# Patient Record
Sex: FEMALE | Race: BLACK OR AFRICAN AMERICAN | NOT HISPANIC OR LATINO
[De-identification: names, ages, dates, MRNs, and addresses within clinical notes are randomized per-mention and may not be internally consistent; named-entity substitution may affect disease eponyms.]

---

## 2017-12-13 PROBLEM — Z00.00 ENCOUNTER FOR PREVENTIVE HEALTH EXAMINATION: Status: ACTIVE | Noted: 2017-12-13

## 2017-12-14 ENCOUNTER — APPOINTMENT (OUTPATIENT)
Dept: NEUROSURGERY | Facility: CLINIC | Age: 52
End: 2017-12-14
Payer: MEDICAID

## 2017-12-14 VITALS
HEIGHT: 63 IN | TEMPERATURE: 98.2 F | OXYGEN SATURATION: 94 % | HEART RATE: 64 BPM | BODY MASS INDEX: 36.32 KG/M2 | DIASTOLIC BLOOD PRESSURE: 70 MMHG | SYSTOLIC BLOOD PRESSURE: 122 MMHG | WEIGHT: 205 LBS

## 2017-12-14 DIAGNOSIS — Z78.9 OTHER SPECIFIED HEALTH STATUS: ICD-10-CM

## 2017-12-14 PROCEDURE — 99203 OFFICE O/P NEW LOW 30 MIN: CPT

## 2017-12-14 RX ORDER — MELOXICAM 15 MG/1
15 TABLET ORAL
Refills: 0 | Status: ACTIVE | COMMUNITY

## 2018-12-05 ENCOUNTER — APPOINTMENT (OUTPATIENT)
Dept: NEUROSURGERY | Facility: CLINIC | Age: 53
End: 2018-12-05
Payer: COMMERCIAL

## 2018-12-05 VITALS
HEIGHT: 63 IN | SYSTOLIC BLOOD PRESSURE: 133 MMHG | WEIGHT: 205 LBS | HEART RATE: 98 BPM | BODY MASS INDEX: 36.32 KG/M2 | OXYGEN SATURATION: 96 % | TEMPERATURE: 98.8 F | DIASTOLIC BLOOD PRESSURE: 69 MMHG

## 2018-12-05 PROCEDURE — 99214 OFFICE O/P EST MOD 30 MIN: CPT

## 2019-01-02 ENCOUNTER — APPOINTMENT (OUTPATIENT)
Dept: ORTHOPEDIC SURGERY | Facility: CLINIC | Age: 54
End: 2019-01-02
Payer: COMMERCIAL

## 2019-01-02 VITALS
HEART RATE: 80 BPM | BODY MASS INDEX: 36.32 KG/M2 | HEIGHT: 63 IN | WEIGHT: 205 LBS | DIASTOLIC BLOOD PRESSURE: 77 MMHG | SYSTOLIC BLOOD PRESSURE: 152 MMHG

## 2019-01-02 PROCEDURE — 73562 X-RAY EXAM OF KNEE 3: CPT | Mod: LT

## 2019-01-02 PROCEDURE — 99203 OFFICE O/P NEW LOW 30 MIN: CPT

## 2019-01-02 RX ORDER — ATORVASTATIN CALCIUM 80 MG/1
TABLET, FILM COATED ORAL
Refills: 0 | Status: ACTIVE | COMMUNITY

## 2019-01-02 RX ORDER — ASPIRIN 81 MG
81 TABLET, DELAYED RELEASE (ENTERIC COATED) ORAL
Refills: 0 | Status: ACTIVE | COMMUNITY

## 2019-02-05 ENCOUNTER — TRANSCRIPTION ENCOUNTER (OUTPATIENT)
Age: 54
End: 2019-02-05

## 2019-02-06 ENCOUNTER — OUTPATIENT (OUTPATIENT)
Dept: OUTPATIENT SERVICES | Facility: HOSPITAL | Age: 54
LOS: 1 days | End: 2019-02-06
Payer: COMMERCIAL

## 2019-02-06 DIAGNOSIS — M51.9 UNSPECIFIED THORACIC, THORACOLUMBAR AND LUMBOSACRAL INTERVERTEBRAL DISC DISORDER: ICD-10-CM

## 2019-02-12 ENCOUNTER — TRANSCRIPTION ENCOUNTER (OUTPATIENT)
Age: 54
End: 2019-02-12

## 2019-02-13 ENCOUNTER — OUTPATIENT (OUTPATIENT)
Dept: OUTPATIENT SERVICES | Facility: HOSPITAL | Age: 54
LOS: 1 days | End: 2019-02-13
Payer: COMMERCIAL

## 2019-02-13 DIAGNOSIS — M47.894 OTHER SPONDYLOSIS, THORACIC REGION: ICD-10-CM

## 2019-07-01 ENCOUNTER — EMERGENCY (EMERGENCY)
Facility: HOSPITAL | Age: 54
LOS: 1 days | Discharge: HOME | End: 2019-07-01
Attending: EMERGENCY MEDICINE
Payer: COMMERCIAL

## 2019-07-01 VITALS
OXYGEN SATURATION: 99 % | RESPIRATION RATE: 16 BRPM | HEIGHT: 63 IN | WEIGHT: 207.01 LBS | HEART RATE: 73 BPM | TEMPERATURE: 99 F | DIASTOLIC BLOOD PRESSURE: 65 MMHG | SYSTOLIC BLOOD PRESSURE: 115 MMHG

## 2019-07-01 LAB
ALBUMIN SERPL ELPH-MCNC: 3.9 G/DL — SIGNIFICANT CHANGE UP (ref 3.3–5.2)
ALP SERPL-CCNC: 84 U/L — SIGNIFICANT CHANGE UP (ref 40–120)
ALT FLD-CCNC: 35 U/L — HIGH
ANION GAP SERPL CALC-SCNC: 11 MMOL/L — SIGNIFICANT CHANGE UP (ref 5–17)
AST SERPL-CCNC: 37 U/L — HIGH
BASOPHILS # BLD AUTO: 0.02 K/UL — SIGNIFICANT CHANGE UP (ref 0–0.2)
BASOPHILS NFR BLD AUTO: 0.5 % — SIGNIFICANT CHANGE UP (ref 0–2)
BILIRUB SERPL-MCNC: 0.3 MG/DL — LOW (ref 0.4–2)
BUN SERPL-MCNC: 18 MG/DL — SIGNIFICANT CHANGE UP (ref 8–20)
CALCIUM SERPL-MCNC: 9.4 MG/DL — SIGNIFICANT CHANGE UP (ref 8.6–10.2)
CHLORIDE SERPL-SCNC: 106 MMOL/L — SIGNIFICANT CHANGE UP (ref 98–107)
CO2 SERPL-SCNC: 22 MMOL/L — SIGNIFICANT CHANGE UP (ref 22–29)
CREAT SERPL-MCNC: 0.64 MG/DL — SIGNIFICANT CHANGE UP (ref 0.5–1.3)
EOSINOPHIL # BLD AUTO: 0.06 K/UL — SIGNIFICANT CHANGE UP (ref 0–0.5)
EOSINOPHIL NFR BLD AUTO: 1.6 % — SIGNIFICANT CHANGE UP (ref 0–6)
GLUCOSE SERPL-MCNC: 97 MG/DL — SIGNIFICANT CHANGE UP (ref 70–115)
HCT VFR BLD CALC: 37.2 % — SIGNIFICANT CHANGE UP (ref 34.5–45)
HGB BLD-MCNC: 11.8 G/DL — SIGNIFICANT CHANGE UP (ref 11.5–15.5)
IMM GRANULOCYTES NFR BLD AUTO: 0.3 % — SIGNIFICANT CHANGE UP (ref 0–1.5)
LYMPHOCYTES # BLD AUTO: 1.87 K/UL — SIGNIFICANT CHANGE UP (ref 1–3.3)
LYMPHOCYTES # BLD AUTO: 50 % — HIGH (ref 13–44)
MCHC RBC-ENTMCNC: 27.7 PG — SIGNIFICANT CHANGE UP (ref 27–34)
MCHC RBC-ENTMCNC: 31.7 GM/DL — LOW (ref 32–36)
MCV RBC AUTO: 87.3 FL — SIGNIFICANT CHANGE UP (ref 80–100)
MONOCYTES # BLD AUTO: 0.44 K/UL — SIGNIFICANT CHANGE UP (ref 0–0.9)
MONOCYTES NFR BLD AUTO: 11.8 % — SIGNIFICANT CHANGE UP (ref 2–14)
NEUTROPHILS # BLD AUTO: 1.34 K/UL — LOW (ref 1.8–7.4)
NEUTROPHILS NFR BLD AUTO: 35.8 % — LOW (ref 43–77)
PLATELET # BLD AUTO: 270 K/UL — SIGNIFICANT CHANGE UP (ref 150–400)
POTASSIUM SERPL-MCNC: 4.7 MMOL/L — SIGNIFICANT CHANGE UP (ref 3.5–5.3)
POTASSIUM SERPL-SCNC: 4.7 MMOL/L — SIGNIFICANT CHANGE UP (ref 3.5–5.3)
PROT SERPL-MCNC: 7.1 G/DL — SIGNIFICANT CHANGE UP (ref 6.6–8.7)
RBC # BLD: 4.26 M/UL — SIGNIFICANT CHANGE UP (ref 3.8–5.2)
RBC # FLD: 13.8 % — SIGNIFICANT CHANGE UP (ref 10.3–14.5)
SODIUM SERPL-SCNC: 139 MMOL/L — SIGNIFICANT CHANGE UP (ref 135–145)
WBC # BLD: 3.74 K/UL — LOW (ref 3.8–10.5)
WBC # FLD AUTO: 3.74 K/UL — LOW (ref 3.8–10.5)

## 2019-07-01 PROCEDURE — 99218: CPT

## 2019-07-01 PROCEDURE — 70450 CT HEAD/BRAIN W/O DYE: CPT | Mod: 26

## 2019-07-01 PROCEDURE — 93010 ELECTROCARDIOGRAM REPORT: CPT

## 2019-07-01 RX ORDER — MECLIZINE HCL 12.5 MG
25 TABLET ORAL ONCE
Refills: 0 | Status: COMPLETED | OUTPATIENT
Start: 2019-07-01 | End: 2019-07-01

## 2019-07-01 RX ORDER — ACETAMINOPHEN 500 MG
650 TABLET ORAL EVERY 6 HOURS
Refills: 0 | Status: DISCONTINUED | OUTPATIENT
Start: 2019-07-01 | End: 2019-07-06

## 2019-07-01 RX ORDER — CELECOXIB 200 MG/1
200 CAPSULE ORAL DAILY
Refills: 0 | Status: DISCONTINUED | OUTPATIENT
Start: 2019-07-01 | End: 2019-07-06

## 2019-07-01 RX ORDER — MECLIZINE HCL 12.5 MG
25 TABLET ORAL EVERY 6 HOURS
Refills: 0 | Status: DISCONTINUED | OUTPATIENT
Start: 2019-07-01 | End: 2019-07-06

## 2019-07-01 RX ORDER — ATORVASTATIN CALCIUM 80 MG/1
40 TABLET, FILM COATED ORAL AT BEDTIME
Refills: 0 | Status: DISCONTINUED | OUTPATIENT
Start: 2019-07-01 | End: 2019-07-06

## 2019-07-01 RX ADMIN — CELECOXIB 200 MILLIGRAM(S): 200 CAPSULE ORAL at 22:35

## 2019-07-01 RX ADMIN — CELECOXIB 200 MILLIGRAM(S): 200 CAPSULE ORAL at 23:20

## 2019-07-01 RX ADMIN — Medication 25 MILLIGRAM(S): at 12:10

## 2019-07-01 RX ADMIN — ATORVASTATIN CALCIUM 40 MILLIGRAM(S): 80 TABLET, FILM COATED ORAL at 22:35

## 2019-07-01 NOTE — ED STATDOCS - EKG ADDITIONAL QUESTION - PERFORMED INDEPENDENT VISUALIZATION
AICD (automatic cardioverter/defibrillator) present  [employer]  Atrial fibrillation    Bladder cancer    CAD (coronary artery disease)  (s/p 2 stents in Sep 2017)  Chronic congestive heart failure, unspecified congestive heart failure type  rEF/pEF  COPD (chronic obstructive pulmonary disease)    Hep C w/o coma, chronic    HTN (hypertension)    Hyperlipidemia    Kidney stone    Nephrolithiasis    Peripheral neuropathy    Prostate cancer  s/p radiation  Renal cell carcinoma of left kidney  s/p partial nephrectomy in 2002  biopsy again in Sep 2017 showed RCC again in stage 2 Yes

## 2019-07-01 NOTE — ED CDU PROVIDER INITIAL DAY NOTE - OBJECTIVE STATEMENT
53 y/o F pt with PMHx of HTN presents to the ED c/o dizziness that onset 3 days ago. Pt reports she noticed the symptoms when she stood up from her bed. She states that symptoms worsen with positional changes however are constant throughout the day. Denies CP, SOB, N/V. No further acute complaints at this time.

## 2019-07-01 NOTE — ED ADULT NURSE REASSESSMENT NOTE - NS ED NURSE REASSESS COMMENT FT1
assumed care of pt @ 2100, report received from Irvin REYNOLDS, charting as noted. Pt AOx4 oob independent, in NAD, Vital Signs Stable. Pt denies any dizziness at this time. pt awaiting MRI head. HR is WNL, lung sounds are clear b/l, abd is soft and nontender with positive bowel sounds in all four quadrants, skin is warm, dry and appropriate for age and race. pt educated on plan of care and observation stay. Plan of care taught back to RN. Proficiency determined from successful pt teach back. Pt oriented to unit, staff, and room. Pt reeducated on call bell use. Bed locked in lowest position, call bell within reach. All questions and concerns addressed.     Pt provided with pillow, warm blanket, and food/drink for comfort.

## 2019-07-01 NOTE — ED STATDOCS - PHYSICAL EXAMINATION
Constitutional - well-developed; well nourished. Head - NCAT. Airway patent. Eyes - PERRL. CV - RRR. no murmur. no edema. Pulm - CTAB. Abd - soft, nt. no rebound. no guarding. Neuro - A&Ox3. strength 5/5 x4. sensation intact x4. normal gait. Skin - No rash. MSK - normal ROM.  CNII-XII intact.

## 2019-07-01 NOTE — ED STATDOCS - OBJECTIVE STATEMENT
55 y/o F pt with PMHx of HTN presents to the ED c/o dizziness that onset 2 days ago. Pt reports she noticed the symptoms when she stood up from her bed. She states that symptoms worsen with positional changes. Denies CP, SOB, N/V. No further acute complaints at this time.

## 2019-07-01 NOTE — ED STATDOCS - PROGRESS NOTE DETAILS
PA note: PT evaluated by intake physician. HPI/PE/ROS as noted above. Will follow up plan per intake physician CT head negative. Pt to be put in obs for MR head.

## 2019-07-01 NOTE — ED STATDOCS - NS ED ROS FT
No fever/chills, No photophobia/eye pain/changes in vision, No ear pain/sore throat/dysphagia, No chest pain/palpitations, no SOB/cough/wheeze/stridor, No abdominal pain, No N/V/D, no dysuria/frequency/discharge, No neck/back pain, no rash, no changes in neurological status/function. (+) dizziness.

## 2019-07-01 NOTE — ED CDU PROVIDER INITIAL DAY NOTE - PROGRESS NOTE DETAILS
Pt seen resting comfortable in no acute distress in bed, no acute complaints will follow CDU initial intake orders observe for change in pt condition, results and or consults.   PE: EAR- clear TM, good light reflex. Gian: A&O x 3, CN II-Xii GI, MAEx 4,  5/5/ motors, = sensation, no pronator drift or ataxia.

## 2019-07-01 NOTE — ED ADULT NURSE NOTE - CAS EDN DISCHARGE ASSESSMENT
Patient baseline mental status/Symptoms improved/Awake/Dressing clean and dry/Alert and oriented to person, place and time

## 2019-07-01 NOTE — ED ADULT NURSE NOTE - OBJECTIVE STATEMENT
Pt c/o dizziness since early Saturday morning. Pt denies any headache, blurred vision, weakness, NVD, SOB, chest pain.

## 2019-07-01 NOTE — ED CDU PROVIDER INITIAL DAY NOTE - ATTENDING CONTRIBUTION TO CARE
I, Morena Kulkarni, participated in the care of this patient with the PA. I discussed the history and physical exam findings as well as lab results and plan of care with the PA. I agree with PA's history, physical and assessment. I, Morena Kulkarni, participated in the care of this patient with the PA. I discussed the history and physical exam findings as well as lab results and plan of care with the PA. I agree with PA's history, physical and assessment.    53 y/o F with PMH HTN presents with dizziness x 3 days, worse with movement, but does not completely resolve with rest. After treatment with meclizine, patient only has dizziness when bending forward or moving quickly.     Exam: NAD, CN II-XII symmetrically intact, RRR and lungs CTA B/L, ambulates steadily without ataxia or assistance or symptoms.    Will obtain MRI to r/o CVA as cause of dizziness, if negative, will prescribe meclizine and follow up with PMD.

## 2019-07-01 NOTE — ED ADULT NURSE NOTE - CHPI ED NUR SYMPTOMS NEG
no numbness/no change in level of consciousness/no confusion/no vomiting/no weakness/no blurred vision/no loss of consciousness/no nausea/no fever

## 2019-07-02 VITALS
OXYGEN SATURATION: 96 % | RESPIRATION RATE: 17 BRPM | DIASTOLIC BLOOD PRESSURE: 74 MMHG | HEART RATE: 66 BPM | TEMPERATURE: 98 F | SYSTOLIC BLOOD PRESSURE: 145 MMHG

## 2019-07-02 PROCEDURE — 99217: CPT

## 2019-07-02 PROCEDURE — 70551 MRI BRAIN STEM W/O DYE: CPT | Mod: 26

## 2019-07-02 NOTE — ED CDU PROVIDER SUBSEQUENT DAY NOTE - ATTENDING CONTRIBUTION TO CARE
reports persistent although improved lightheadedness.  denies vertigo. denies near syncope.  Awaiting MRI for further evaluation

## 2019-07-02 NOTE — ED CDU PROVIDER DISPOSITION NOTE - CLINICAL COURSE
53 y/o female admitted to observation for MRI for evaluation of dizziness. MR reviewed with radiologist by Dr. Stone and is wnl. Will d/c with PMD follow up.

## 2019-07-02 NOTE — ED CDU PROVIDER SUBSEQUENT DAY NOTE - HISTORY
55 y/o female admitted to observation for dizziness pending MRI. PT resting comfortably in stretcher in no apparent distress. Reports that she felt lightheaded yesterday. Denies spinning sensation,. Denies any other c/o currently

## 2019-07-02 NOTE — ED ADULT NURSE REASSESSMENT NOTE - INTERVENTIONS DEFINITIONS
Non-slip footwear when patient is off stretcher/Physically safe environment: no spills, clutter or unnecessary equipment/Call bell, personal items and telephone within reach/Stretcher in lowest position, wheels locked, appropriate side rails in place/[place] to call system/Instruct patient to call for assistance

## 2019-07-02 NOTE — ED ADULT NURSE REASSESSMENT NOTE - NS ED NURSE REASSESS COMMENT FT1
Pt alert and oriented, no apparent distress noted at this time. Pt handed off to ran REYNOLDS in stable condition.

## 2019-08-01 ENCOUNTER — OUTPATIENT (OUTPATIENT)
Dept: OUTPATIENT SERVICES | Facility: HOSPITAL | Age: 54
LOS: 1 days | End: 2019-08-01
Payer: MEDICAID

## 2019-08-01 PROCEDURE — G9001: CPT

## 2019-08-19 DIAGNOSIS — Z71.89 OTHER SPECIFIED COUNSELING: ICD-10-CM

## 2019-08-20 ENCOUNTER — TRANSCRIPTION ENCOUNTER (OUTPATIENT)
Age: 54
End: 2019-08-20

## 2019-08-21 ENCOUNTER — OUTPATIENT (OUTPATIENT)
Dept: OUTPATIENT SERVICES | Facility: HOSPITAL | Age: 54
LOS: 1 days | End: 2019-08-21
Payer: COMMERCIAL

## 2019-08-21 DIAGNOSIS — M47.894 OTHER SPONDYLOSIS, THORACIC REGION: ICD-10-CM

## 2019-08-27 ENCOUNTER — TRANSCRIPTION ENCOUNTER (OUTPATIENT)
Age: 54
End: 2019-08-27

## 2019-08-28 ENCOUNTER — OUTPATIENT (OUTPATIENT)
Dept: OUTPATIENT SERVICES | Facility: HOSPITAL | Age: 54
LOS: 1 days | End: 2019-08-28
Payer: COMMERCIAL

## 2019-08-28 DIAGNOSIS — M51.9 UNSPECIFIED THORACIC, THORACOLUMBAR AND LUMBOSACRAL INTERVERTEBRAL DISC DISORDER: ICD-10-CM

## 2020-01-28 ENCOUNTER — TRANSCRIPTION ENCOUNTER (OUTPATIENT)
Age: 55
End: 2020-01-28

## 2020-01-29 ENCOUNTER — OUTPATIENT (OUTPATIENT)
Dept: OUTPATIENT SERVICES | Facility: HOSPITAL | Age: 55
LOS: 1 days | End: 2020-01-29
Payer: COMMERCIAL

## 2020-01-29 DIAGNOSIS — M47.816 SPONDYLOSIS WITHOUT MYELOPATHY OR RADICULOPATHY, LUMBAR REGION: ICD-10-CM

## 2020-06-03 NOTE — ED CDU PROVIDER SUBSEQUENT DAY NOTE - SKIN, MLM
Seen 2/19/20  Medication refill escribed per protocol/Dr Nida Acosta      Skin normal color for race, warm, dry and intact. No evidence of rash.

## 2020-09-17 ENCOUNTER — APPOINTMENT (OUTPATIENT)
Dept: ORTHOPEDIC SURGERY | Facility: CLINIC | Age: 55
End: 2020-09-17
Payer: MEDICAID

## 2020-09-17 VITALS
BODY MASS INDEX: 35.79 KG/M2 | HEIGHT: 63 IN | SYSTOLIC BLOOD PRESSURE: 148 MMHG | HEART RATE: 80 BPM | WEIGHT: 202 LBS | DIASTOLIC BLOOD PRESSURE: 81 MMHG

## 2020-09-17 PROCEDURE — 99214 OFFICE O/P EST MOD 30 MIN: CPT

## 2020-09-17 PROCEDURE — 73564 X-RAY EXAM KNEE 4 OR MORE: CPT | Mod: LT

## 2020-09-17 NOTE — PHYSICAL EXAM
[de-identified] : The patient appears well nourished  and in no apparent distress.  The patient is alert and oriented to person, place, and time.   Affect and mood appear normal. The head is normocephalic and atraumatic.  The eyes reveal normal sclera and extra ocular muscles are intact. The tongue is midline with no apparent lesions.  Skin shows normal turgor with no evidence of eczema or psoriasis.  No respiratory distress noted.  Sensation grossly intact.\par   [de-identified] : Exam of the left knee shows substantial lateral translation of the patella with at least partial subluxation and patella apprehension. 5/5 motor strength bilaterally distally. Sensation intact distally.  [de-identified] : Xray- 4 views of the left knee shows mild arthritis.  Patella is centered.

## 2020-09-17 NOTE — DISCUSSION/SUMMARY
[de-identified] : The patient is a 55 year old female with patella instability. Conservative options were discussed. She was given a patella stabilization brace. We discussed patella realignment surgery in the future if she fails conservative treatment. We discussed the use of over-the-counter anti-inflammatories as well as activity modification and ice as needed. She may follow up in 6 weeks for repeat evaluation.

## 2020-09-17 NOTE — CONSULT LETTER
[Dear  ___] : Dear  [unfilled], [Consult Letter:] : I had the pleasure of evaluating your patient, [unfilled]. [Please see my note below.] : Please see my note below. [Consult Closing:] : Thank you very much for allowing me to participate in the care of this patient.  If you have any questions, please do not hesitate to contact me. [Sincerely,] : Sincerely, [FreeTextEntry2] : Jose Maria Murrell MD [FreeTextEntry3] : Rikki Murillo MD\par Chief of Joint Replacement\par [facility] \par [facility]\par \par

## 2020-09-17 NOTE — ADDENDUM
[FreeTextEntry1] : This note was authored by Dwayne Eduardo working as a medical scribe for Dr. Rikki Murillo. The note was reviewed, edited, and revised by Dr. Rikki Murillo whom is in agreement with the exam findings, imaging findings, and treatment plan. 09/17/2020.

## 2020-10-29 ENCOUNTER — APPOINTMENT (OUTPATIENT)
Dept: ORTHOPEDIC SURGERY | Facility: CLINIC | Age: 55
End: 2020-10-29

## 2020-11-11 ENCOUNTER — APPOINTMENT (OUTPATIENT)
Dept: ORTHOPEDIC SURGERY | Facility: CLINIC | Age: 55
End: 2020-11-11
Payer: MEDICAID

## 2020-11-11 VITALS
BODY MASS INDEX: 35.79 KG/M2 | HEART RATE: 87 BPM | HEIGHT: 63 IN | SYSTOLIC BLOOD PRESSURE: 144 MMHG | WEIGHT: 202 LBS | DIASTOLIC BLOOD PRESSURE: 81 MMHG

## 2020-11-11 PROCEDURE — 99214 OFFICE O/P EST MOD 30 MIN: CPT

## 2020-11-11 PROCEDURE — 99072 ADDL SUPL MATRL&STAF TM PHE: CPT

## 2020-11-11 NOTE — PHYSICAL EXAM
[de-identified] : General:\par Awake, alert, no acute distress, Patient was cooperative and appropriate during the examination.\par \par The patient is obese for height and age.\par \par Walks with an antalgic gait on the left side.\par \par Full, painless range of motion of the neck and back.\par \par Exam of the bilateral lower extremities is intact and symmetric with regards to dermatologic, vascular, and neurologic exam. Bilateral lower extremity sensation is grossly intact to light touch in the DP/SP/T/S/S nerve distributions. Intact DF/PF/EHL. BIlateral lower extremity warm and well-perfused with brisk capillary refill.\par \par Pulmonary:\par Regular, nonlabored breathing\par \par Abdomen:\par Soft, nontender, nondistended.\par \par Lymphatic:\par No evidence of inguinal lymphadenopathy\par \par Left lower extremity and lower back examination:\par Physical examination of the lower extremity demonstrates normal skin without signs of skin changes or abnormalities. No erythema, warmth, or joint effusion is appreciated.\par \par Sensation is intact to light touch L2-S1\par Palpable DP/PT pulse\par EHL/FHL/TA/GSC motor function intact\par \par Range of Motion\par Knee: 0 to 130 degrees\par Back: Full range of motion without pain\par \par Strength Testing\par EHL/FHL/TA/GSC 5/5 bilaterally\par Hamstring/quadriceps/hip flexors/hip extensors 4/5 on the left, 5/5 in the right\par Patient is able to perform a straight leg raise without difficulty.\par \par Palpation\par Not tender to palpation about the distal femur, proximal tibia\par Moderate tenderness and significant apprehension about the patella\par No palpable defect appreciated in the patellar tendons\par Significant quadriceps atrophy with questionable defect near the superior/medial aspect of the patella of the left knee\par Not tender to palpation of medial joint line\par Not tender to palpation of lateral joint line\par \par Special Tests\par Anterior Drawer negative\par Posterior Drawer negative\par Lachman Exam negative\par No Varus or Valgus Laxity at 0 or 30 degrees of knee flexion\par Dorothea's Test negative\par Active compression of the patella positive for pain\par Significant patellar apprehension with hypermobility both medially and laterally.  I can translate the patella greater than 2 quadrants without an endpoint laterally.\par Clonus positive for less than 3 beats bilaterally\par Normal Babinski's\par \par \par \par \par \par \par  [de-identified] : X-rays including 4 views of the left knee from her previous visit in September reviewed the patient today.  Mild arthritic changes are noted in the knee with mild joint space narrowing and subchondral sclerosis of the medial compartment.  Patient has a normal-appearing trochlear groove with the patella sitting concentrically with no evidence of lateral tilt.  No acute fractures or dislocations are noted.

## 2020-11-11 NOTE — DISCUSSION/SUMMARY
[de-identified] : Assessment: 55-year-old female with left knee pain secondary to instability and left lower extremity weakness secondary to possible muscular atrophy\par \par Plan: I had a long discussion with the patient today regarding the nature of their diagnosis and treatment plan.  We discussed the risks and benefits of no treatment as well as nonoperative and operative treatments.  I reviewed the patient's previous x-rays with her today which do not demonstrate any acute pathology.  Patient has significant patellar instability as well as palpable muscular atrophy and a possible defect near the quadriceps tendon.  Recommend an MRI of the left knee to rule out any muscular, ligamentous, or intra-articular injury.  Recommend follow-up after the MRI to discuss the results in person further treatment recommendations.  Explained to the patient that given her pre-existing history of back pain this muscular atrophy may have been etiology stemming from her lumbar spine.  Pending her MRI results of the knee may consider referring her to one of our spine specialist.  She may continue to treat her self conservatively with modalities including bracing, anti-inflammatories as needed, and home exercises.  Patient verbalizes understanding agrees with plan.  All questions were answered to her satisfaction.

## 2020-11-11 NOTE — HISTORY OF PRESENT ILLNESS
[de-identified] : Donna is a pleasant 55-year-old female who presents my office today complaining of left knee pain.  Patient states that her pain began approximately 2 years ago but she denies any history of trauma or inciting event that she can recall.  Her pain is activity related and alleviated by rest.  She has difficulty walking even short distances because of the pain.  Her right leg also feels weak compared to the other side.  She has seen a physiatrist who has been treating her for back pain as well.  She also saw my partner Dr. Murillo in September who diagnosed her with patellar instability and provided her with a stabilization brace which she has been wearing.  She had been in physical therapy in the past but has not gone for any recent visits.  She has had no recent injections.  She denies any fevers, chills, sweats, recent illnesses, numbness, tingling, radicular pain, or pain elsewhere.

## 2020-11-17 ENCOUNTER — APPOINTMENT (OUTPATIENT)
Dept: MRI IMAGING | Facility: CLINIC | Age: 55
End: 2020-11-17
Payer: MEDICAID

## 2020-11-17 ENCOUNTER — OUTPATIENT (OUTPATIENT)
Dept: OUTPATIENT SERVICES | Facility: HOSPITAL | Age: 55
LOS: 1 days | End: 2020-11-17

## 2020-11-17 DIAGNOSIS — Z00.8 ENCOUNTER FOR OTHER GENERAL EXAMINATION: ICD-10-CM

## 2020-11-17 PROCEDURE — 73721 MRI JNT OF LWR EXTRE W/O DYE: CPT | Mod: 26,LT

## 2020-11-20 ENCOUNTER — APPOINTMENT (OUTPATIENT)
Dept: ORTHOPEDIC SURGERY | Facility: CLINIC | Age: 55
End: 2020-11-20
Payer: MEDICAID

## 2020-11-20 VITALS
HEIGHT: 63 IN | WEIGHT: 202 LBS | BODY MASS INDEX: 35.79 KG/M2 | TEMPERATURE: 97.5 F | DIASTOLIC BLOOD PRESSURE: 90 MMHG | SYSTOLIC BLOOD PRESSURE: 176 MMHG | HEART RATE: 73 BPM

## 2020-11-20 PROCEDURE — 99213 OFFICE O/P EST LOW 20 MIN: CPT

## 2020-11-21 PROBLEM — E78.5 HYPERLIPIDEMIA, UNSPECIFIED: Chronic | Status: ACTIVE | Noted: 2019-07-01

## 2020-11-21 PROBLEM — I10 ESSENTIAL (PRIMARY) HYPERTENSION: Chronic | Status: ACTIVE | Noted: 2019-07-01

## 2020-12-01 ENCOUNTER — APPOINTMENT (OUTPATIENT)
Dept: MRI IMAGING | Facility: CLINIC | Age: 55
End: 2020-12-01
Payer: MEDICAID

## 2020-12-01 ENCOUNTER — OUTPATIENT (OUTPATIENT)
Dept: OUTPATIENT SERVICES | Facility: HOSPITAL | Age: 55
LOS: 1 days | End: 2020-12-01

## 2020-12-01 DIAGNOSIS — Z00.8 ENCOUNTER FOR OTHER GENERAL EXAMINATION: ICD-10-CM

## 2020-12-01 PROCEDURE — 72148 MRI LUMBAR SPINE W/O DYE: CPT | Mod: 26

## 2020-12-07 ENCOUNTER — APPOINTMENT (OUTPATIENT)
Dept: ORTHOPEDIC SURGERY | Facility: CLINIC | Age: 55
End: 2020-12-07
Payer: MEDICAID

## 2020-12-07 VITALS
WEIGHT: 202 LBS | BODY MASS INDEX: 35.79 KG/M2 | HEART RATE: 80 BPM | DIASTOLIC BLOOD PRESSURE: 86 MMHG | HEIGHT: 63 IN | SYSTOLIC BLOOD PRESSURE: 158 MMHG

## 2020-12-07 VITALS — TEMPERATURE: 96.7 F

## 2020-12-07 PROCEDURE — 99072 ADDL SUPL MATRL&STAF TM PHE: CPT

## 2020-12-07 PROCEDURE — 99213 OFFICE O/P EST LOW 20 MIN: CPT

## 2021-01-05 ENCOUNTER — APPOINTMENT (OUTPATIENT)
Dept: ORTHOPEDIC SURGERY | Facility: CLINIC | Age: 56
End: 2021-01-05
Payer: MEDICAID

## 2021-01-05 VITALS
HEIGHT: 63 IN | DIASTOLIC BLOOD PRESSURE: 83 MMHG | BODY MASS INDEX: 35.79 KG/M2 | HEART RATE: 85 BPM | SYSTOLIC BLOOD PRESSURE: 153 MMHG | WEIGHT: 202 LBS

## 2021-01-05 DIAGNOSIS — M41.80 OTHER FORMS OF SCOLIOSIS, SITE UNSPECIFIED: ICD-10-CM

## 2021-01-05 DIAGNOSIS — M17.12 UNILATERAL PRIMARY OSTEOARTHRITIS, LEFT KNEE: ICD-10-CM

## 2021-01-05 PROCEDURE — 99214 OFFICE O/P EST MOD 30 MIN: CPT

## 2021-01-05 PROCEDURE — 72100 X-RAY EXAM L-S SPINE 2/3 VWS: CPT

## 2021-01-05 PROCEDURE — 99072 ADDL SUPL MATRL&STAF TM PHE: CPT

## 2021-01-05 NOTE — ADDENDUM
[FreeTextEntry1] : Documented by Rober Lay acting as a scribe for Windy Sampson on 12/03/2020. All medical record entries made by the Scribe were at my, Windy Sampson, direction and personally dictated by me on 12/03/2020 . I have reviewed the chart and agree that the record accurately reflects my personal performance of the history, physical exam, assessment and plan. I have also personally directed, reviewed, and agreed with the chart.

## 2021-01-05 NOTE — PHYSICAL EXAM
[Obese] : obese [Poor Appearance] : well-appearing [Acute Distress] : not in acute distress [de-identified] : CONSTITUTIONAL: The patient is a very pleasant individual who is well-nourished and who appears stated age.\par PSYCHIATRIC: The patient is alert and oriented X 3 and in no apparent distress, and participates with orthopedic evaluation well.\par HEAD: Atraumatic and is nonsyndromic in appearance.\par EENT: No visible thyromegaly, EOMI.\par RESPIRATORY: Respiratory rate is regular, not dyspneic on examination.\par LYMPHATICS: There is no inguinal lymphadenopathy\par INTEGUMENTARY: Skin is clean, dry, and intact about the bilateral lower extremities and lumbar spine.\par VASCULAR: There is brisk capillary refill about the bilateral lower extremities.\par NEUROLOGIC: There are no pathologic reflexes. There is no decrease in sensation of the bilateral lower extremities on Wartenberg pinwheel examination. Deep tendon reflexes are well maintained at 2+/4 of the bilateral lower extremities and are symmetric. \par MUSCULOSKELETAL: There is no visible muscular atrophy. Manual motor strength is well maintained in the bilateral lower extremities. Range of motion of lumbar spine is well maintained. The patient ambulates in a non-myelopathic manner. Negative tension sign and straight leg raise bilaterally. Quad extension, ankle dorsiflexion, EHL, plantar flexion, and ankle eversion are well preserved. Normal secondary orthopaedic exam of bilateral hips, greater trochanteric area, knees and ankles.  [de-identified] : MRI of the lumbar spine taken at [facility] on 12/01/2020 demonstrates age-appropriate lumbar spondylosis, there is foraminal compromise at L5-S1 bilaterally.\par \par Xray of the lumbar spine taken 01/05/2021 demonstrates degenerative rotoscoliosis.

## 2021-01-05 NOTE — HISTORY OF PRESENT ILLNESS
[de-identified] : 55 year old F presents for an initial evaluation of chronic left knee issues which worsened over the last year, she has seen Dr. Nelson for this issue. When walking sometimes her knee gives out. She also complains of right buttock pain radiating down the right posterior thigh intermittently. She does have to sit to alleviate this pain, I believe this is consistent with neurogenic claudication signs and symptoms. Denies any paresthesias. She has been getting SONIA injections which helps with her lower back and leg pain. 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 \par \par  [Ataxia] : no ataxia [Incontinence] : no incontinence [Loss of Dexterity] : good dexterity [Urinary Ret.] : no urinary retention

## 2021-02-04 ENCOUNTER — APPOINTMENT (OUTPATIENT)
Dept: ORTHOPEDIC SURGERY | Facility: CLINIC | Age: 56
End: 2021-02-04
Payer: MEDICAID

## 2021-02-04 VITALS
SYSTOLIC BLOOD PRESSURE: 146 MMHG | DIASTOLIC BLOOD PRESSURE: 88 MMHG | HEART RATE: 84 BPM | TEMPERATURE: 96.9 F | HEIGHT: 63 IN | WEIGHT: 202 LBS | BODY MASS INDEX: 35.79 KG/M2

## 2021-02-04 PROCEDURE — 99072 ADDL SUPL MATRL&STAF TM PHE: CPT

## 2021-02-04 PROCEDURE — 20610 DRAIN/INJ JOINT/BURSA W/O US: CPT | Mod: LT

## 2021-02-04 PROCEDURE — 99213 OFFICE O/P EST LOW 20 MIN: CPT | Mod: 25

## 2021-04-02 ENCOUNTER — APPOINTMENT (OUTPATIENT)
Dept: NEUROLOGY | Facility: CLINIC | Age: 56
End: 2021-04-02
Payer: MEDICAID

## 2021-04-02 VITALS
BODY MASS INDEX: 34.15 KG/M2 | WEIGHT: 200 LBS | DIASTOLIC BLOOD PRESSURE: 70 MMHG | TEMPERATURE: 97.6 F | HEIGHT: 64 IN | SYSTOLIC BLOOD PRESSURE: 130 MMHG

## 2021-04-02 PROCEDURE — 99072 ADDL SUPL MATRL&STAF TM PHE: CPT

## 2021-04-02 PROCEDURE — 99204 OFFICE O/P NEW MOD 45 MIN: CPT

## 2021-04-02 RX ORDER — GABAPENTIN 100 MG
100 TABLET ORAL
Refills: 0 | Status: DISCONTINUED | COMMUNITY
End: 2021-04-02

## 2021-04-02 RX ORDER — CHLORZOXAZONE 500 MG/1
500 TABLET ORAL
Refills: 0 | Status: DISCONTINUED | COMMUNITY
End: 2021-04-02

## 2021-04-02 RX ORDER — CYCLOBENZAPRINE HYDROCHLORIDE 5 MG/1
5 TABLET, FILM COATED ORAL
Refills: 0 | Status: DISCONTINUED | COMMUNITY
End: 2021-04-02

## 2021-04-02 NOTE — CONSULT LETTER
[Dear  ___] : Dear  [unfilled], [Consult Letter:] : I had the pleasure of evaluating your patient, [unfilled]. [Please see my note below.] : Please see my note below. [Consult Closing:] : Thank you very much for allowing me to participate in the care of this patient.  If you have any questions, please do not hesitate to contact me. [Sincerely,] : Sincerely, [FreeTextEntry3] : Ollie Cao M.D., Ph.D. DPN-N\par [facility]\par Neurology at [place]\par Medical Director of Stroke Services\par [facility]\par

## 2021-04-02 NOTE — PHYSICAL EXAM
[General Appearance - Alert] : alert [General Appearance - In No Acute Distress] : in no acute distress [General Appearance - Well Nourished] : well nourished [General Appearance - Well Developed] : well developed [Person] : oriented to person [Place] : oriented to place [Time] : oriented to time [Remote Intact] : remote memory intact [Registration Intact] : recent registration memory intact [Span Intact] : the attention span was normal [Concentration Intact] : normal concentrating ability [Visual Intact] : visual attention was ~T not ~L decreased [Naming Objects] : no difficulty naming common objects [Repeating Phrases] : no difficulty repeating a phrase [Fluency] : fluency intact [Comprehension] : comprehension intact [Current Events] : adequate knowledge of current events [Past History] : adequate knowledge of personal past history [Cranial Nerves Oculomotor (III)] : extraocular motion intact [Cranial Nerves Optic (II)] : visual acuity intact bilaterally,  visual fields full to confrontation, pupils equal round and reactive to light [Cranial Nerves Trigeminal (V)] : facial sensation intact symmetrically [Cranial Nerves Facial (VII)] : face symmetrical [Cranial Nerves Vestibulocochlear (VIII)] : hearing was intact bilaterally [Cranial Nerves Glossopharyngeal (IX)] : tongue and palate midline [Cranial Nerves Accessory (XI - Cranial And Spinal)] : head turning and shoulder shrug symmetric [Cranial Nerves Hypoglossal (XII)] : there was no tongue deviation with protrusion [Motor Tone] : muscle tone was normal in all four extremities [Involuntary Movements] : no involuntary movements were seen [No Muscle Atrophy] : normal bulk in all four extremities [Motor Strength Lower Extremities Left] : there was weakness of the left lower extremity [Sensation Tactile Decrease] : light touch was intact [Sensation Pain / Temperature Decrease] : pain and temperature was intact [Sensation Vibration Decrease] : vibration was intact [Proprioception] : proprioception was intact [Abnormal Walk] : normal gait [Balance] : balance was intact [Tremor] : no tremor present [Coordination - Dysmetria Impaired Finger-to-Nose Bilateral] : not present [1+] : Patella right 1+ [0] : Ankle jerk left 0 [___] : absent on the right [___] : absent on the left [FreeTextEntry6] : There is 4/5 weakness on the left quadriceps with preserved (5/5) hamstring dorsiflexion and plantar flexion as well as hip flexors [PERRL With Normal Accommodation] : pupils were equal in size, round, reactive to light, with normal accommodation [Sclera] : the sclera and conjunctiva were normal [Extraocular Movements] : extraocular movements were intact [No APD] : no afferent pupillary defect [No JASMYN] : no internuclear ophthalmoplegia [Full Visual Field] : full visual field

## 2021-04-02 NOTE — HISTORY OF PRESENT ILLNESS
[FreeTextEntry1] : Initial office visit April 2, 2021:\par This is a 55-year-old woman who presents today for evaluation of left leg weakness. She states that starting in June of last year she had significant amount of knee pain. She felt as if her leg was hyperextending at times. She has seen orthopedics for this as well as a spine specialist. She had an MRI of her knee in November of 2020 which showed fatty atrophy of the quadriceps muscle on her left. Lumbar spine MRI showed multilevel degenerative changes with stenosis at L3-4 and varying degrees of foraminal stenosis but no significant foraminal stenosis that would effect her left-sided L2, L3 or L4 nerve roots. She is here today for neurologic evaluation.

## 2021-04-02 NOTE — REVIEW OF SYSTEMS
[Leg Weakness] : leg weakness [As Noted in HPI] : as noted in HPI [Joint Pain] : joint pain [Negative] : Heme/Lymph

## 2021-04-02 NOTE — DATA REVIEWED
[de-identified] : MRI of the lumbar spine was done December 1, 2020. There were multilevel degenerative changes. There was severe right and moderate left foraminal stenosis at L5-S1. There is moderate spinal canal stenosis at L3-4 with minimal left foraminal narrowing.

## 2021-04-02 NOTE — ASSESSMENT
[FreeTextEntry1] : This is a 55-year-old woman with atrophy of her quadriceps and corresponding weakness. At this time she should continue physical therapy and wear the brace is prescribed for her knee. I will arrange for EMG/nerve conduction study of her left lower extremity. Should this prove normal I would likely pursue imaging of her brain and cervical and thoracic spine. I will see her back in the office in a time frame depending upon the results of her tests.

## 2021-04-08 ENCOUNTER — APPOINTMENT (OUTPATIENT)
Dept: NEUROLOGY | Facility: CLINIC | Age: 56
End: 2021-04-08
Payer: MEDICAID

## 2021-04-08 PROCEDURE — 99072 ADDL SUPL MATRL&STAF TM PHE: CPT

## 2021-04-08 PROCEDURE — 95909 NRV CNDJ TST 5-6 STUDIES: CPT

## 2021-04-08 PROCEDURE — 95886 MUSC TEST DONE W/N TEST COMP: CPT

## 2021-04-27 ENCOUNTER — APPOINTMENT (OUTPATIENT)
Dept: ORTHOPEDIC SURGERY | Facility: CLINIC | Age: 56
End: 2021-04-27
Payer: MEDICAID

## 2021-04-27 PROCEDURE — 99072 ADDL SUPL MATRL&STAF TM PHE: CPT

## 2021-04-27 PROCEDURE — 99213 OFFICE O/P EST LOW 20 MIN: CPT

## 2021-04-27 NOTE — DISCUSSION/SUMMARY
[de-identified] : Assessment: 55-year-old female with left lower extremity weakness, left knee pain and patellar instability\par \par Plan: I had a long discussion with the patient today regarding the nature of their diagnosis and treatment plan. We discussed the risks and benefits of no treatment as well as nonoperative and operative treatments.  The patient continues to complain of significant pain in her left knee.  Her recent EMG is negative for any obvious pathology which could explain her quadriceps atrophy.  At this point I recommend follow-up again with her neurologist Dr. Cao to see if you would like to consider any further imaging of her brain or spine.  She may continue to use of symptomatically with activity modifications, bracing, and anti-inflammatories as needed.  She deferred any further injections at this time.  Recommend follow-up with me after she sees her neurologist and obtaining any further imaging that may be recommended.\par \par The patient verbalizes their understanding and agrees with the plan.  All questions were answered to their satisfaction.

## 2021-04-27 NOTE — HISTORY OF PRESENT ILLNESS
[de-identified] : Ms. WATSON is a pleasant 55 year old female who is being seen for follow-up for left knee pain.  Patient symptoms of left lower extremity weakness and left knee pain and instability are unchanged since her previous visit.  She continues to complain of pain and weakness in her left leg.  She recently saw Dr. Cao from neurology who evaluated her for possible peripheral neuropathy.  They ordered an EMG which was negative.  They may consider ordering further imaging of the spine and brain given these results.  The patient returns today for further follow-up and management of the same.  The patient denies any fevers, chills, sweats, recent illnesses, numbness, tingling, or pain elsewhere at this time.

## 2021-04-27 NOTE — PHYSICAL EXAM
[de-identified] : General:\par Awake, alert, no acute distress, Patient was cooperative and appropriate during the examination.\par \par The patient is obese for height and age.\par \par Walks with an antalgic gait on the left side.\par \par Full, painless range of motion of the neck and back.\par \par Exam of the bilateral lower extremities is intact and symmetric with regards to dermatologic, vascular, and neurologic exam. Bilateral lower extremity sensation is grossly intact to light touch in the DP/SP/T/S/S nerve distributions. Intact DF/PF/EHL. BIlateral lower extremity warm and well-perfused with brisk capillary refill.\par \par Pulmonary:\par Regular, nonlabored breathing\par \par Abdomen:\par Soft, nontender, nondistended.\par \par Lymphatic:\par No evidence of inguinal lymphadenopathy\par \par Left lower extremity and lower back examination:\par Physical examination of the lower extremity demonstrates normal skin without signs of skin changes or abnormalities. No erythema, warmth, or joint effusion is appreciated.\par \par Sensation is intact to light touch L2-S1\par Palpable DP/PT pulse\par EHL/FHL/TA/GSC motor function intact\par \par Range of Motion\par Knee: 0 to 130 degrees\par Back: Full range of motion without pain\par \par Strength Testing\par EHL/FHL/TA/GSC 5/5 bilaterally\par Hamstring/quadriceps/hip flexors/hip extensors 4/5 on the left, 5/5 in the right\par Patient is able to perform a straight leg raise without difficulty.\par \par Palpation\par Not tender to palpation about the distal femur, proximal tibia\par Moderate tenderness and significant apprehension about the patella\par No palpable defect appreciated in the patellar tendons\par Significant quadriceps atrophy with no palpable defect\par Not tender to palpation of medial joint line\par Not tender to palpation of lateral joint line\par \par Special Tests\par Anterior Drawer negative\par Posterior Drawer negative\par Lachman Exam negative\par No Varus or Valgus Laxity at 0 or 30 degrees of knee flexion\par Dorothea's Test negative\par Active compression of the patella positive for pain\par Significant patellar apprehension with hypermobility both medially and laterally. I can translate the patella greater than 2 quadrants without an endpoint laterally.\par Clonus positive for less than 3 beats bilaterally\par Normal Babinski's\par Deep tendon reflexes 1+ bilaterally\par  [de-identified] : MRI of the lumbar spine is significant for lumbar spondylosis with severe right and moderate left foraminal narrowing at L5-S1.  Moderate spinal canal stenosis at L3-4.\par \par EMG of the bilateral lower extremities from [facility] neurology facility is normal.\par

## 2021-07-13 ENCOUNTER — APPOINTMENT (OUTPATIENT)
Dept: NEUROLOGY | Facility: CLINIC | Age: 56
End: 2021-07-13
Payer: MEDICAID

## 2021-07-13 PROCEDURE — 99213 OFFICE O/P EST LOW 20 MIN: CPT

## 2021-07-13 NOTE — CONSULT LETTER
[Dear  ___] : Dear  [unfilled], [Courtesy Letter:] : I had the pleasure of seeing your patient, [unfilled], in my office today. [Please see my note below.] : Please see my note below. [Consult Closing:] : Thank you very much for allowing me to participate in the care of this patient.  If you have any questions, please do not hesitate to contact me. [Sincerely,] : Sincerely, [FreeTextEntry3] : Ollie Cao M.D., Ph.D. DPN-N\par [facility]\par Neurology at [place]\par Medical Director of Stroke Services\par [facility]\par

## 2021-07-13 NOTE — HISTORY OF PRESENT ILLNESS
[FreeTextEntry1] : Initial office visit April 2, 2021:\par This is a 55-year-old woman who presents today for evaluation of left leg weakness. She states that starting in June of last year she had significant amount of knee pain. She felt as if her leg was hyperextending at times. She has seen orthopedics for this as well as a spine specialist. She had an MRI of her knee in November of 2020 which showed fatty atrophy of the quadriceps muscle on her left. Lumbar spine MRI showed multilevel degenerative changes with stenosis at L3-4 and varying degrees of foraminal stenosis but no significant foraminal stenosis that would effect her left-sided L2, L3 or L4 nerve roots. She is here today for neurologic evaluation.\par \par Followup July 13, 2021:\par This is a 56-year-old woman who presents for followup of left leg weakness. She has had knee pain for about a year workup showed atrophy in the quadriceps muscle on an MRI of the left knee. She also has stenosis at L3-4. She had an EMG which did not show any acute denervation. She does not have a patellar reflex on the left. She is here today for neurologic followup.

## 2021-07-13 NOTE — ASSESSMENT
[FreeTextEntry1] : This is a 56-year-old woman with left leg weakness. She has atrophy without active denervation signs. This may be a chronic process. I will send her for physical therapy for strengthening training. Sawyer and wear the brace that she was given her on the need to short stability and to prevent further injury. I'll do thoracic and cervical spine MRIs to rule out any sort of spinal cord pathology. I will see her back in 2 months was associated with physical therapy.

## 2021-07-13 NOTE — PHYSICAL EXAM
[Person] : oriented to person [Place] : oriented to place [Time] : oriented to time [Remote Intact] : remote memory intact [Registration Intact] : recent registration memory intact [Span Intact] : the attention span was normal [Concentration Intact] : normal concentrating ability [Visual Intact] : visual attention was ~T not ~L decreased [Naming Objects] : no difficulty naming common objects [Repeating Phrases] : no difficulty repeating a phrase [Fluency] : fluency intact [Comprehension] : comprehension intact [Current Events] : adequate knowledge of current events [Past History] : adequate knowledge of personal past history [Cranial Nerves Optic (II)] : visual acuity intact bilaterally,  visual fields full to confrontation, pupils equal round and reactive to light [Cranial Nerves Oculomotor (III)] : extraocular motion intact [Cranial Nerves Trigeminal (V)] : facial sensation intact symmetrically [Cranial Nerves Facial (VII)] : face symmetrical [Cranial Nerves Vestibulocochlear (VIII)] : hearing was intact bilaterally [Cranial Nerves Glossopharyngeal (IX)] : tongue and palate midline [Cranial Nerves Accessory (XI - Cranial And Spinal)] : head turning and shoulder shrug symmetric [Cranial Nerves Hypoglossal (XII)] : there was no tongue deviation with protrusion [Motor Tone] : muscle tone was normal in all four extremities [Involuntary Movements] : no involuntary movements were seen [No Muscle Atrophy] : normal bulk in all four extremities [Motor Strength Lower Extremities Left] : there was weakness of the left lower extremity [Sensation Tactile Decrease] : light touch was intact [Sensation Pain / Temperature Decrease] : pain and temperature was intact [Sensation Vibration Decrease] : vibration was intact [Proprioception] : proprioception was intact [Balance] : balance was intact [Tremor] : no tremor present [Coordination - Dysmetria Impaired Finger-to-Nose Bilateral] : not present [1+] : Patella right 1+ [0] : Patella left 0 [___] : absent on the right [___] : absent on the left [FreeTextEntry6] : There is 4/5 weakness on the left quadriceps with preserved (5/5) hamstring dorsiflexion and plantar flexion as well as hip flexors [FreeTextEntry8] : Hyperextension left knee at times [Sclera] : the sclera and conjunctiva were normal [PERRL With Normal Accommodation] : pupils were equal in size, round, reactive to light, with normal accommodation [Extraocular Movements] : extraocular movements were intact [No APD] : no afferent pupillary defect [No JASMYN] : no internuclear ophthalmoplegia [Full Visual Field] : full visual field

## 2021-07-23 ENCOUNTER — OUTPATIENT (OUTPATIENT)
Dept: OUTPATIENT SERVICES | Facility: HOSPITAL | Age: 56
LOS: 1 days | End: 2021-07-23
Payer: MEDICAID

## 2021-07-23 ENCOUNTER — APPOINTMENT (OUTPATIENT)
Dept: MRI IMAGING | Facility: CLINIC | Age: 56
End: 2021-07-23
Payer: MEDICAID

## 2021-07-23 ENCOUNTER — RESULT REVIEW (OUTPATIENT)
Age: 56
End: 2021-07-23

## 2021-07-23 DIAGNOSIS — M62.552 MUSCLE WASTING AND ATROPHY, NOT ELSEWHERE CLASSIFIED, LEFT THIGH: ICD-10-CM

## 2021-07-23 PROCEDURE — 72141 MRI NECK SPINE W/O DYE: CPT

## 2021-07-23 PROCEDURE — 72146 MRI CHEST SPINE W/O DYE: CPT

## 2021-07-23 PROCEDURE — 72141 MRI NECK SPINE W/O DYE: CPT | Mod: 26

## 2021-07-23 PROCEDURE — 72146 MRI CHEST SPINE W/O DYE: CPT | Mod: 26

## 2021-09-28 ENCOUNTER — APPOINTMENT (OUTPATIENT)
Dept: NEUROLOGY | Facility: CLINIC | Age: 56
End: 2021-09-28
Payer: MEDICAID

## 2021-09-28 PROCEDURE — 99213 OFFICE O/P EST LOW 20 MIN: CPT

## 2021-09-28 NOTE — HISTORY OF PRESENT ILLNESS
[FreeTextEntry1] : Initial office visit April 2, 2021:\par This is a 55-year-old woman who presents today for evaluation of left leg weakness. She states that starting in June of last year she had significant amount of knee pain. She felt as if her leg was hyperextending at times. She has seen orthopedics for this as well as a spine specialist. She had an MRI of her knee in November of 2020 which showed fatty atrophy of the quadriceps muscle on her left. Lumbar spine MRI showed multilevel degenerative changes with stenosis at L3-4 and varying degrees of foraminal stenosis but no significant foraminal stenosis that would effect her left-sided L2, L3 or L4 nerve roots. She is here today for neurologic evaluation.\par \par Followup July 13, 2021:\par This is a 56-year-old woman who presents for followup of left leg weakness. She has had knee pain for about a year workup showed atrophy in the quadriceps muscle on an MRI of the left knee. She also has stenosis at L3-4. She had an EMG which did not show any acute denervation. She does not have a patellar reflex on the left. She is here today for neurologic followup.\par \par Followup September 28, 2021:\par This is a 56-year-old woman who presents today for followup of left leg weakness. Upon workup for knee pain and number of the knee showed left quadricep atrophy. MRI of the spine showed multilevel degenerative changes but no significant central canal stenosis or left-sided foraminal stenosis I would explain this. Further testing of the cervical and thoracic spine did not show any cord abnormalities that might cause this. EMG of her lower extremities did not show any significant abnormalities. She is in physical therapy but is not improving. If anything her knee feels a bit more weak and unsteady on her feet. She is here today for neurologic followup.

## 2021-09-28 NOTE — ASSESSMENT
[FreeTextEntry1] : This is a 56-year-old woman with left leg weakness secondary to quadriceps atrophy. This time I find her neurologic reason why she has this atrophy. Bike for evaluated by physiatry to see if she needs a brace to stabilize her left leg. I've given her the name of my colleague at [facility] to see. I will see her back in the office as needed.

## 2021-09-28 NOTE — CONSULT LETTER
[Dear  ___] : Dear  [unfilled], [Courtesy Letter:] : I had the pleasure of seeing your patient, [unfilled], in my office today. [Please see my note below.] : Please see my note below. [Consult Closing:] : Thank you very much for allowing me to participate in the care of this patient.  If you have any questions, please do not hesitate to contact me. [Sincerely,] : Sincerely, [FreeTextEntry3] : Ollie Cao M.D., Ph.D. DPN-N\par [facility]\par Neurology at [place]\par Medical Director of Stroke Services\par [facility]\par

## 2021-10-14 ENCOUNTER — APPOINTMENT (OUTPATIENT)
Dept: PHYSICAL MEDICINE AND REHAB | Facility: CLINIC | Age: 56
End: 2021-10-14
Payer: MEDICAID

## 2021-10-14 VITALS
DIASTOLIC BLOOD PRESSURE: 86 MMHG | BODY MASS INDEX: 34.49 KG/M2 | HEIGHT: 64 IN | RESPIRATION RATE: 12 BRPM | HEART RATE: 86 BPM | SYSTOLIC BLOOD PRESSURE: 138 MMHG | WEIGHT: 202 LBS

## 2021-10-14 DIAGNOSIS — Z78.9 OTHER SPECIFIED HEALTH STATUS: ICD-10-CM

## 2021-10-14 DIAGNOSIS — M48.061 SPINAL STENOSIS, LUMBAR REGION WITHOUT NEUROGENIC CLAUDICATION: ICD-10-CM

## 2021-10-14 DIAGNOSIS — Z86.39 PERSONAL HISTORY OF OTHER ENDOCRINE, NUTRITIONAL AND METABOLIC DISEASE: ICD-10-CM

## 2021-10-14 DIAGNOSIS — M17.11 UNILATERAL PRIMARY OSTEOARTHRITIS, RIGHT KNEE: ICD-10-CM

## 2021-10-14 PROCEDURE — 99204 OFFICE O/P NEW MOD 45 MIN: CPT

## 2021-10-14 NOTE — ASSESSMENT
[FreeTextEntry1] : 56 y.o. obese F w/ b/l genu recurvatum (L > R), b/l ligamentous laxity w/ suspicion of generalized hypermobility d/o, L HF/KE atrophy/weakness and lumbar DDD w/ suspicion for chronic R L5 radiculopathy w/ DF weakness.  I spent most of today's office visit (40 min) reviewing the patient's MRIs, EMG, discussing etiology and pathogenesis, as well as further non-operative management.  Although the patient's chronic R ankle DF weakness may be explained by the severe R L5-S1 NF stenosis on MRI, her L HF/KE weakness is likely non-spine related with suspicion for chronic patella subluxation causing KE mechanism inhibition leading to muscle atrophy and buckling.  Her most recent EMG was normal (although paucity of L2-3, L3-4 muscles were sampled on patient's left side).  Low clinical concern for underlying peripheral polyneuropathy.  Pt. may benefit from Ortho knee brace to aid in gait stabilization.  Advised pt. to c/w HEP.  All questions answered.  RTC 3 months.

## 2021-10-14 NOTE — HISTORY OF PRESENT ILLNESS
[FreeTextEntry1] : 56 y.o. F presents to office w/ c/o left knee instability and difficulty ambulating.  Pt. 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  Pt. endorses numbness in toes of both feet for "long time".  No h/o DM or AI disease.  Pt. consulted Dr. Nelson (Ortho) and Dr. Cao (Neuro).  Pt. states that she is currently participating in P.T. which causes knee discomfort and is not helping with her KE weakness.  MRI L-spine and EMG LE done and reviewed below.

## 2021-10-14 NOTE — DATA REVIEWED
[MRI] : MRI [FreeTextEntry1] : MRI L-spine (Dec 2020): Lumbar spondylosis with severe right and moderate left foraminal narrowing at L5-S1. Moderate spinal canal stenosis at L3-L4.  No significant LEFT NF stenosis (ie, L2-3, L3-4).\par \par MRI L knee (2020): Small popliteal cyst.  Fatty atrophy of the quadriceps musculature is partially visualized. No abnormal muscle edema.  Mild heterogeneous increased signal in the lateral tibial plateau, question chondrosis. There is likely chondral thinning in the patella.  No meniscus tear is seen.\par \par EMG B/L LE (April 2021): normal\par

## 2021-10-14 NOTE — PHYSICAL EXAM
[FreeTextEntry1] : NAD\par A&Ox3\par Obese\par Observation\par b/l genu valgus deformities\par b/l L > R genu recurvatum\par Left Patella: subluxes; + grind\par Stability: pseudolaxity w/ varus stress\par MMT: 4-/5 L HF/KE; 5-/5 L TA; 4-/4 R TA\par ROM Hips: hypermobile hips IR/ER w/o pain\par Pelvic tilt: none\par Seated slump test: neg\par SLR: neg\par DTR's: absent knees/ankles\par MMT: as above\par Sensation: SILT.  No decrement to PP testing L4-S1 dermatomes\par Toe & Heel Walk:\par Palpation:\par

## 2021-11-11 ENCOUNTER — APPOINTMENT (OUTPATIENT)
Dept: PHYSICAL MEDICINE AND REHAB | Facility: CLINIC | Age: 56
End: 2021-11-11
Payer: MEDICAID

## 2021-11-11 VITALS
HEART RATE: 90 BPM | WEIGHT: 205 LBS | DIASTOLIC BLOOD PRESSURE: 87 MMHG | HEIGHT: 64 IN | SYSTOLIC BLOOD PRESSURE: 160 MMHG | BODY MASS INDEX: 35 KG/M2 | RESPIRATION RATE: 14 BRPM

## 2021-11-11 DIAGNOSIS — M25.562 PAIN IN LEFT KNEE: ICD-10-CM

## 2021-11-11 PROCEDURE — 99214 OFFICE O/P EST MOD 30 MIN: CPT

## 2021-11-11 NOTE — ASSESSMENT
[FreeTextEntry1] : 56 y.o. obese F w/ b/l genu recurvatum (L > R), b/l ligamentous laxity w/ suspicion of generalized hypermobility d/o, L HF/KE atrophy/weakness and lumbar DDD w/ suspicion for chronic R L5 radiculopathy w/ DF weakness.  I again spent most of today's office visit (25 min) discussing etiology and pathogenesis, as well as further non-operative management.  Although the patient's chronic R ankle DF weakness may be explained by the severe R L5-S1 NF stenosis on MRI, her L HF/KE weakness is likely non-spine related with suspicion for chronic patella subluxation causing KE mechanism inhibition leading to muscle atrophy and buckling.  Her most recent EMG was normal (although paucity of L2-3, L3-4 muscles were sampled on patient's left side).  Low clinical concern for underlying peripheral polyneuropathy.  Pt. advised to wear jointed knee brace to aid in gait stabilization.  Will add on course of aqua therapy to help strengthen and stabilize.  Pt. is in agreement with plan.  All questions answered.  RTC 3 months.

## 2021-11-11 NOTE — PHYSICAL EXAM
[FreeTextEntry1] : NAD\par A&Ox3\par Obese\par No significant change in today's examination\par Observation\par b/l genu valgus deformities\par b/l L > R genu recurvatum\par Left Patella: subluxes (CHRONIC); + grind\par Stability: pseudolaxity w/ varus stress\par MMT: 4-/5 L HF/KE; 5-/5 L TA; 4-/5 R TA - STATIC & NON-PROGRESSIVE\par ROM Hips: hypermobile hips IR/ER w/o pain\par Pelvic tilt: none\par Seated slump test: neg\par SLR: neg\par DTR's: absent knees/ankles\par MMT: as above\par Sensation: SILT.  No decrement to PP testing L4-S1 dermatomes\par Toe & Heel Walk: deferred\par Gait: b/l Trendelenburg gait w/ dynamic genu valgus\par

## 2021-11-11 NOTE — HISTORY OF PRESENT ILLNESS
[FreeTextEntry1] : 56 y.o. obese F w/ b/l genu recurvatum (L > R), b/l ligamentous laxity w/ suspicion of generalized hypermobility d/o, L HF/KE atrophy/weakness and lumbar DDD w/ suspicion for chronic R L5 radiculopathy w/ DF weakness returns to office for f/u.  Pt. states that P.T. is not helpful and has brought on left knee pain.  Pt. reports to change in her left hip or KE strength.  Denies pain radiating down right leg.  She is compliant w/ her HEP.  Still takes meloxicam which is not helpful.

## 2022-01-19 ENCOUNTER — APPOINTMENT (OUTPATIENT)
Dept: PHYSICAL MEDICINE AND REHAB | Facility: CLINIC | Age: 57
End: 2022-01-19

## 2022-02-28 ENCOUNTER — APPOINTMENT (OUTPATIENT)
Dept: PHYSICAL MEDICINE AND REHAB | Facility: CLINIC | Age: 57
End: 2022-02-28
Payer: MEDICAID

## 2022-02-28 DIAGNOSIS — M62.552 MUSCLE WASTING AND ATROPHY, NOT ELSEWHERE CLASSIFIED, LEFT THIGH: ICD-10-CM

## 2022-02-28 DIAGNOSIS — M54.16 RADICULOPATHY, LUMBAR REGION: ICD-10-CM

## 2022-02-28 PROCEDURE — 99214 OFFICE O/P EST MOD 30 MIN: CPT

## 2022-02-28 NOTE — HISTORY OF PRESENT ILLNESS
[FreeTextEntry1] : 56 y.o. obese F w/ b/l genu recurvatum (L > R), b/l ligamentous laxity w/ suspicion of generalized hypermobility d/o, L HF/KE atrophy/weakness and lumbar DDD w/ suspicion for chronic R L5 radiculopathy w/ DF weakness returns to office for f/u.  Pt. states that her sxs are static and non-progressive.  She continues to wear jointed knee brace which has been helpful.  She continues in her course of aqua therapy.

## 2022-02-28 NOTE — ASSESSMENT
[FreeTextEntry1] : 56 y.o. obese F w/ h/o b/l genu recurvatum (L > R), b/l ligamentous laxity w/ suspicion of generalized hypermobility d/o, L HF/KE atrophy/weakness and lumbar DDD w/ suspicion for chronic R L5 radiculopathy w/ DF weakness.  I again spent most of today's office visit (25 min) discussing etiology and pathogenesis, as well as further non-operative management.  Although the patient's chronic R ankle DF weakness may be explained by the severe R L5-S1 NF stenosis on MRI, her L HF/KE weakness is likely non-spine related with suspicion for chronic patella subluxation causing KE mechanism inhibition leading to muscle atrophy and buckling.  No indication for lumbar TFESI at this time.  Her most recent EMG was normal (although paucity of L2-3, L3-4 muscles were sampled on patient's left side).  Low clinical concern for underlying peripheral polyneuropathy.  Again advised pt. to wear jointed knee brace to aid in gait stabilization.  Will c/w course of aqua therapy to help strengthen and stabilize.  Pt. is in agreement with plan.  All questions answered.  RTC 3-6 months.

## 2022-03-26 ENCOUNTER — EMERGENCY (EMERGENCY)
Facility: HOSPITAL | Age: 57
LOS: 1 days | Discharge: HOME | End: 2022-03-26
Attending: EMERGENCY MEDICINE
Payer: COMMERCIAL

## 2022-03-26 VITALS
DIASTOLIC BLOOD PRESSURE: 100 MMHG | OXYGEN SATURATION: 98 % | TEMPERATURE: 98 F | RESPIRATION RATE: 16 BRPM | WEIGHT: 203.05 LBS | SYSTOLIC BLOOD PRESSURE: 177 MMHG | HEIGHT: 63 IN | HEART RATE: 92 BPM

## 2022-03-26 VITALS
SYSTOLIC BLOOD PRESSURE: 164 MMHG | DIASTOLIC BLOOD PRESSURE: 71 MMHG | HEART RATE: 83 BPM | OXYGEN SATURATION: 100 % | RESPIRATION RATE: 18 BRPM

## 2022-03-26 LAB
APPEARANCE UR: CLEAR — SIGNIFICANT CHANGE UP
BACTERIA # UR AUTO: ABNORMAL
BILIRUB UR-MCNC: NEGATIVE — SIGNIFICANT CHANGE UP
COLOR SPEC: YELLOW — SIGNIFICANT CHANGE UP
D DIMER BLD IA.RAPID-MCNC: 205 NG/ML DDU — SIGNIFICANT CHANGE UP
DIFF PNL FLD: ABNORMAL
EPI CELLS # UR: ABNORMAL
GLUCOSE UR QL: NEGATIVE MG/DL — SIGNIFICANT CHANGE UP
HCT VFR BLD CALC: 37.6 % — SIGNIFICANT CHANGE UP (ref 34.5–45)
HGB BLD-MCNC: 11.8 G/DL — SIGNIFICANT CHANGE UP (ref 11.5–15.5)
KETONES UR-MCNC: NEGATIVE — SIGNIFICANT CHANGE UP
LEUKOCYTE ESTERASE UR-ACNC: ABNORMAL
MCHC RBC-ENTMCNC: 27.3 PG — SIGNIFICANT CHANGE UP (ref 27–34)
MCHC RBC-ENTMCNC: 31.4 GM/DL — LOW (ref 32–36)
MCV RBC AUTO: 86.8 FL — SIGNIFICANT CHANGE UP (ref 80–100)
NITRITE UR-MCNC: NEGATIVE — SIGNIFICANT CHANGE UP
PH UR: 6 — SIGNIFICANT CHANGE UP (ref 5–8)
PLATELET # BLD AUTO: 162 K/UL — SIGNIFICANT CHANGE UP (ref 150–400)
PROT UR-MCNC: NEGATIVE — SIGNIFICANT CHANGE UP
RBC # BLD: 4.33 M/UL — SIGNIFICANT CHANGE UP (ref 3.8–5.2)
RBC # FLD: 14.1 % — SIGNIFICANT CHANGE UP (ref 10.3–14.5)
RBC CASTS # UR COMP ASSIST: SIGNIFICANT CHANGE UP /HPF (ref 0–4)
SP GR SPEC: 1.02 — SIGNIFICANT CHANGE UP (ref 1.01–1.02)
UROBILINOGEN FLD QL: NEGATIVE MG/DL — SIGNIFICANT CHANGE UP
WBC # BLD: 5.66 K/UL — SIGNIFICANT CHANGE UP (ref 3.8–10.5)
WBC # FLD AUTO: 5.66 K/UL — SIGNIFICANT CHANGE UP (ref 3.8–10.5)
WBC UR QL: SIGNIFICANT CHANGE UP /HPF (ref 0–5)

## 2022-03-26 PROCEDURE — 99285 EMERGENCY DEPT VISIT HI MDM: CPT

## 2022-03-26 PROCEDURE — 71101 X-RAY EXAM UNILAT RIBS/CHEST: CPT | Mod: 26

## 2022-03-26 PROCEDURE — 85027 COMPLETE CBC AUTOMATED: CPT

## 2022-03-26 PROCEDURE — 99284 EMERGENCY DEPT VISIT MOD MDM: CPT | Mod: 25

## 2022-03-26 PROCEDURE — 71101 X-RAY EXAM UNILAT RIBS/CHEST: CPT

## 2022-03-26 PROCEDURE — 81001 URINALYSIS AUTO W/SCOPE: CPT

## 2022-03-26 PROCEDURE — 36415 COLL VENOUS BLD VENIPUNCTURE: CPT

## 2022-03-26 PROCEDURE — 85379 FIBRIN DEGRADATION QUANT: CPT

## 2022-03-26 PROCEDURE — 80053 COMPREHEN METABOLIC PANEL: CPT

## 2022-03-26 PROCEDURE — 93005 ELECTROCARDIOGRAM TRACING: CPT

## 2022-03-26 PROCEDURE — 93010 ELECTROCARDIOGRAM REPORT: CPT

## 2022-03-26 PROCEDURE — 96374 THER/PROPH/DIAG INJ IV PUSH: CPT

## 2022-03-26 RX ORDER — METHOCARBAMOL 500 MG/1
1 TABLET, FILM COATED ORAL
Qty: 15 | Refills: 0
Start: 2022-03-26 | End: 2022-03-30

## 2022-03-26 RX ORDER — METHOCARBAMOL 500 MG/1
1500 TABLET, FILM COATED ORAL ONCE
Refills: 0 | Status: COMPLETED | OUTPATIENT
Start: 2022-03-26 | End: 2022-03-26

## 2022-03-26 RX ORDER — IBUPROFEN 200 MG
1 TABLET ORAL
Qty: 28 | Refills: 0
Start: 2022-03-26 | End: 2022-04-01

## 2022-03-26 RX ORDER — KETOROLAC TROMETHAMINE 30 MG/ML
30 SYRINGE (ML) INJECTION ONCE
Refills: 0 | Status: DISCONTINUED | OUTPATIENT
Start: 2022-03-26 | End: 2022-03-26

## 2022-03-26 RX ADMIN — Medication 30 MILLIGRAM(S): at 15:04

## 2022-03-26 RX ADMIN — Medication 30 MILLIGRAM(S): at 16:16

## 2022-03-26 RX ADMIN — METHOCARBAMOL 1500 MILLIGRAM(S): 500 TABLET, FILM COATED ORAL at 15:03

## 2022-03-26 NOTE — ED STATDOCS - PROGRESS NOTE DETAILS
57 y/o female with PMHx of HTN, HLD. Treats the HLD with garlic and no other medication. Pt presents to the ED with c/o left sided chest since yesterday morning. Pt also states leg, shoulder pain as well. No other complaints. Pt sent to [facility] for eval by another provider, ekg done and no signs of ischemia

## 2022-03-26 NOTE — ED PROVIDER NOTE - OBJECTIVE STATEMENT
57 yo female pmh htn comes to ed with left sided back pain after cleaning; pt noted pain increases with palpation and movement; pt denies any trauma, sob, chest pain or fever;

## 2022-03-26 NOTE — ED ADULT NURSE NOTE - OBJECTIVE STATEMENT
Pt is A&O x3, pt is resting comfortably in bed showing no signs of respiratory distress, pt came in complaining of left sided rib/flank pain that started Friday morning, pt denies N/V/D, pt denies any problems urinating, pt up to bathroom

## 2022-03-26 NOTE — ED PROVIDER NOTE - NSFOLLOWUPINSTRUCTIONS_ED_ALL_ED_FT
robaxin 750mg by mouth 3 times a day for 5 days   motrin 600mg by mouth every 6 hours  follow up with doctor in 3 - 5 days

## 2022-03-26 NOTE — ED PROVIDER NOTE - PATIENT PORTAL LINK FT
You can access the FollowMyHealth Patient Portal offered by [facility] by registering at the following website: [email or web address]. By joining [facility]’s FollowMyHealth portal, you will also be able to view your health information using other applications (apps) compatible with our system.

## 2022-03-26 NOTE — ED STATDOCS - OBJECTIVE STATEMENT
57 y/o female with PMHx of HTN, HLD. Treats the HLD with garlic and no other medication. Pt presents to the ED with c/o left sided chest since yesterday morning. Pt also states leg, shoulder pain as well. No other complaints.

## 2022-03-26 NOTE — ED PROVIDER NOTE - PHYSICAL EXAMINATION
Alert, lucid, and in no apparent distress. Pt is normocephalic, atraumatic.  Pupils are equal, round, lips pink, moist mucous membranes, tongue midline. Neck supple.   Lungs clear to auscultation. Heart regular rate and rhythm, normal S1, S2,   Abdomen is soft, nontender, no pulsatile mass, no masses, no distension,   Non-focal sensory, 5 out of 5 motor strength, no dysmetria, fluent, goal directed speech.  No submandibular adenopathy. Normal mentation, does not appear agitated

## 2022-03-26 NOTE — ED ADULT TRIAGE NOTE - CHIEF COMPLAINT QUOTE
pt c/o left flank pain starting yeturday morning increasing in severity denies urinary symptoms   pt states pain under ribs left flank side

## 2022-06-17 NOTE — ED ADULT TRIAGE NOTE - ESI TRIAGE ACUITY LEVEL, MLM
Physical Therapist recommended a Nima Richter as reflected in his notes on 6/16/2022. Patient requested an order be sent to his physical therapist so he could get a cane. Please advise. 3

## 2022-08-23 NOTE — ED STATDOCS - HISTORY ATTESTATION, MLM
I have reviewed and confirmed nurses' notes... Melolabial Transposition Flap Text: The defect edges were debeveled with a #15 scalpel blade.  Given the location of the defect and the proximity to free margins a melolabial flap was deemed most appropriate.  Using a sterile surgical marker, an appropriate melolabial transposition flap was drawn incorporating the defect.    The area thus outlined was incised deep to adipose tissue with a #15 scalpel blade.  The skin margins were undermined to an appropriate distance in all directions utilizing iris scissors.
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
